# Patient Record
Sex: MALE | Race: WHITE | Employment: FULL TIME | URBAN - METROPOLITAN AREA
[De-identification: names, ages, dates, MRNs, and addresses within clinical notes are randomized per-mention and may not be internally consistent; named-entity substitution may affect disease eponyms.]

---

## 2017-12-17 ENCOUNTER — HOSPITAL ENCOUNTER (EMERGENCY)
Age: 37
Discharge: HOME | End: 2017-12-17
Attending: EMERGENCY MEDICINE | Admitting: EMERGENCY MEDICINE
Payer: COMMERCIAL

## 2017-12-17 ENCOUNTER — APPOINTMENT (OUTPATIENT)
Dept: GENERAL RADIOLOGY | Age: 37
End: 2017-12-17
Attending: EMERGENCY MEDICINE
Payer: COMMERCIAL

## 2017-12-17 VITALS
HEART RATE: 94 BPM | HEIGHT: 69 IN | DIASTOLIC BLOOD PRESSURE: 95 MMHG | BODY MASS INDEX: 29.62 KG/M2 | SYSTOLIC BLOOD PRESSURE: 148 MMHG | OXYGEN SATURATION: 100 % | RESPIRATION RATE: 16 BRPM | TEMPERATURE: 98.6 F | WEIGHT: 200 LBS
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
## 2017-12-17 DIAGNOSIS — S39.012A STRAIN OF LUMBAR REGION, INITIAL ENCOUNTER: ICD-10-CM

## 2017-12-17 DIAGNOSIS — S46.912A STRAIN OF LEFT SHOULDER, INITIAL ENCOUNTER: Primary | ICD-10-CM

## 2017-12-17 PROCEDURE — 99283 EMERGENCY DEPT VISIT LOW MDM: CPT

## 2017-12-17 PROCEDURE — 72110 X-RAY EXAM L-2 SPINE 4/>VWS: CPT

## 2017-12-17 PROCEDURE — 73030 X-RAY EXAM OF SHOULDER: CPT

## 2017-12-17 PROCEDURE — 74011250637 HC RX REV CODE- 250/637: Performed by: EMERGENCY MEDICINE

## 2017-12-17 RX ORDER — IBUPROFEN 800 MG/1
800 TABLET ORAL
Qty: 20 TAB | Refills: 0 | Status: SHIPPED | OUTPATIENT
Start: 2017-12-17 | End: 2017-12-24

## 2017-12-17 RX ORDER — CYCLOBENZAPRINE HCL 5 MG
10 TABLET ORAL 3 TIMES DAILY
Qty: 9 TAB | Refills: 0 | Status: SHIPPED | OUTPATIENT
Start: 2017-12-17

## 2017-12-17 RX ORDER — IBUPROFEN 400 MG/1
800 TABLET ORAL
Status: COMPLETED | OUTPATIENT
Start: 2017-12-17 | End: 2017-12-17

## 2017-12-17 RX ADMIN — IBUPROFEN 800 MG: 400 TABLET, FILM COATED ORAL at 15:18
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
## 2017-12-17 NOTE — LETTER
[facility] EMERGENCY DEPT 
[address] [place] [id number] [phone] Work/School Note Date: 12/17/2017 To Whom It May concern: 
 
Tasha Oreilly was seen and treated today in the emergency room by the following provider(s): 
Attending Provider: Haja Escobar MD. Tasha Oreilly may return to work on 12/19/17 with LIGHT DUTY for 1 week or until cleared by physician. LIGHT DUTY: no lifting, bending, twisting, no prolonged work standing. Sincerely, Haja Escobar MD

## 2017-12-17 NOTE — DISCHARGE INSTRUCTIONS
Back Strain: Care Instructions  Your Care Instructions    Back strain happens when you overstretch, or pull, a muscle in your back. You may hurt your back in an accident or when you exercise or lift something. Most back pain will get better with rest and time. You can take care of yourself at home to help your back heal.  Follow-up care is a key part of your treatment and safety. Be sure to make and go to all appointments, and call your doctor if you are having problems. It's also a good idea to know your test results and keep a list of the medicines you take. How can you care for yourself at home? · Try to stay as active as you can, but stop or reduce any activity that causes pain. · Put ice or a cold pack on the sore muscle for 10 to 20 minutes at a time to stop swelling. Try this every 1 to 2 hours for 3 days (when you are awake) or until the swelling goes down. Put a thin cloth between the ice pack and your skin. · After 2 or 3 days, apply a heating pad on low or a warm cloth to your back. Some doctors suggest that you go back and forth between hot and cold treatments. · Take pain medicines exactly as directed. ¨ If the doctor gave you a prescription medicine for pain, take it as prescribed. ¨ If you are not taking a prescription pain medicine, ask your doctor if you can take an over-the-counter medicine. · Try sleeping on your side with a pillow between your legs. Or put a pillow under your knees when you lie on your back. These measures can ease pain in your lower back. · Return to your usual level of activity slowly. When should you call for help? Call 911 anytime you think you may need emergency care. For example, call if:  ? · You are unable to move a leg at all. ?Call your doctor now or seek immediate medical care if:  ? · You have new or worse symptoms in your legs, belly, or buttocks. Symptoms may include:  ¨ Numbness or tingling. ¨ Weakness. ¨ Pain.    ? · You lose bladder or bowel control. ? Watch closely for changes in your health, and be sure to contact your doctor if you are not getting better as expected. Where can you learn more? Go to http://jakob-jennifer.info/. Enter T604 in the search box to learn more about \"Back Strain: Care Instructions. \"  Current as of: March 21, 2017  Content Version: 11.4  © 7976-8716 [employer]. Care instructions adapted under license by [employer] (which disclaims liability or warranty for this information). If you have questions about a medical condition or this instruction, always ask your healthcare professional. [place] [address] any warranty or liability for your use of this information.

## 2017-12-20 ENCOUNTER — OFFICE VISIT (OUTPATIENT)
Dept: ORTHOPEDIC SURGERY | Facility: CLINIC | Age: 37
End: 2017-12-20

## 2017-12-20 VITALS
TEMPERATURE: 96.8 F | HEIGHT: 69 IN | OXYGEN SATURATION: 99 % | SYSTOLIC BLOOD PRESSURE: 121 MMHG | RESPIRATION RATE: 18 BRPM | HEART RATE: 90 BPM | DIASTOLIC BLOOD PRESSURE: 78 MMHG | WEIGHT: 205.8 LBS | BODY MASS INDEX: 30.48 KG/M2

## 2017-12-20 DIAGNOSIS — S43.52XA SPRAIN OF LEFT ACROMIOCLAVICULAR JOINT, INITIAL ENCOUNTER: Primary | ICD-10-CM

## 2017-12-20 DIAGNOSIS — S14.3XXA BRACHIAL PLEXUS INJURY, LEFT, INITIAL ENCOUNTER: ICD-10-CM

## 2017-12-20 DIAGNOSIS — S40.012A: ICD-10-CM

## 2017-12-20 PROBLEM — F33.9 RECURRENT DEPRESSION (HCC): Status: ACTIVE | Noted: 2017-12-20

## 2017-12-21 NOTE — PATIENT INSTRUCTIONS
Shoulder Separation: Care Instructions  Your Care Instructions    A shoulder separation is a tearing of the ligaments that connect two bones of the shoulder-the collarbone (clavicle) and the end of the shoulder blade (acromion). The ligaments can be partially or completely torn. This is usually caused by a blow to the top of the shoulder or a fall onto an outstretched arm. Shoulder injuries can be slow to heal, but with time and effort, your shoulder should get better. Physical therapy can help you regain strength, motion, and flexibility in your shoulder. Follow-up care is a key part of your treatment and safety. Be sure to make and go to all appointments, and call your doctor if you are having problems. It's also a good idea to know your test results and keep a list of the medicines you take. How can you care for yourself at home? · If your doctor put your arm in a sling, wear the sling as directed. Do not take it off before your doctor tells you to. · Take pain medicines exactly as directed. ¨ If the doctor gave you a prescription medicine for pain, take it as prescribed. ¨ If you are not taking a prescription pain medicine, ask your doctor if you can take an over-the-counter medicine. · Rest your shoulder as much as you can. · Put ice or a cold pack on your shoulder for 10 to 20 minutes at a time. Try to do this every 1 to 2 hours for the next 3 days (when you are awake) or until the swelling goes down. Put a thin cloth between the ice and your skin. · You may use warm packs after the first 3 days for 15 to 20 minutes at a time to ease pain. · If your doctor gave you exercises to do at home, do them exactly as instructed. · Do not do anything that makes pain worse. · Go to all follow-up appointments. You and your doctor will decide if you need further treatment, including surgery. You and your doctor will also decide when to begin physical therapy, if it is needed.   When should you call for help?  Call your doctor now or seek immediate medical care if:  ? · Your pain gets a lot worse. ? · You cannot move your arm. ? · You have new weakness, numbness, or tingling in your hand or arm. ? · Your arm or hand is cool or pale or changes color. ? · Your sling feels too tight, and you cannot loosen it. ? Watch closely for changes in your health, and be sure to contact your doctor if:  ? · You have new or increased swelling in your arm. ? · You have new pain that develops in another area of your arm. For example, you have pain in your hand or elbow. ? · You do not get better as expected. Where can you learn more? Go to http://jakob-jennifer.info/. Enter S051 in the search box to learn more about \"Shoulder Separation: Care Instructions. \"  Current as of: March 21, 2017  Content Version: 11.4  © 2537-4099 [employer]. Care instructions adapted under license by [employer] (which disclaims liability or warranty for this information). If you have questions about a medical condition or this instruction, always ask your healthcare professional. [place] [address] any warranty or liability for your use of this information.
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
## 2018-01-03 ENCOUNTER — OFFICE VISIT (OUTPATIENT)
Dept: ORTHOPEDIC SURGERY | Facility: CLINIC | Age: 38
End: 2018-01-03

## 2018-01-03 VITALS
HEART RATE: 80 BPM | DIASTOLIC BLOOD PRESSURE: 83 MMHG | RESPIRATION RATE: 16 BRPM | SYSTOLIC BLOOD PRESSURE: 131 MMHG | OXYGEN SATURATION: 100 % | HEIGHT: 69 IN

## 2018-01-03 DIAGNOSIS — S40.012D: ICD-10-CM

## 2018-01-03 DIAGNOSIS — S43.52XD ACROMIOCLAVICULAR SPRAIN, LEFT, SUBSEQUENT ENCOUNTER: Primary | ICD-10-CM

## 2018-01-03 NOTE — PROGRESS NOTES
Patient: Savannah Gomez                MRN: [id number]       SSN: [id number]  YOB: 1980        AGE: 40 y.o. SEX: male  There is no height or weight on file to calculate BMI. PCP: Kaylin Ro MD  01/03/18    Chief Complaint: No changes from previous visit complaint. HPI: Savannah Gomez returns today for a follow up appointment for his left shoulder injury. He has been gradually getting back to his activities with his left arm, doing only light things. Last week, while pushing up from a chair, he felt a pop in his left shoulder/clavicle area with a short pain which has since resolved. He has not felt this again as of today. No other complaints at this time. No complaints of numbness/tingling/weakness. Past Medical History:   Diagnosis Date    Anxiety     Depression        History reviewed. No pertinent family history. Current Outpatient Prescriptions   Medication Sig Dispense Refill    cyclobenzaprine (FLEXERIL) 5 mg tablet Take 2 Tabs by mouth three (3) times daily. 9 Tab 0    ALPRAZolam (XANAX) 2 mg tablet Begin with one half of a tab 1 hour prior to the procedure. May repeat dose 30 minutes later if initial dose had little to no effect. If you take the medication, you must have somebody drive you home from the procedure. 1 Tab 0    varenicline (CHANTIX) 1 mg tablet Take 1 mg by mouth two (2) times daily (after meals).  SERTRALINE HCL (ZOLOFT PO) Take  by mouth.  NAPROXEN (NAPROSYN PO) Take  by mouth. No Known Allergies    History reviewed. No pertinent surgical history. Social History     Social History    Marital status: SINGLE     Spouse name: N/A    Number of children: N/A    Years of education: N/A     Occupational History    Not on file.      Social History Main Topics    Smoking status: Former Smoker     Packs/day: 1.00    Smokeless tobacco: Never Used    Alcohol use 0.0 oz/week     0 Standard drinks or equivalent per week    Drug use: No    Sexual activity: Not on file     Other Topics Concern    Not on file     Social History Narrative       REVIEW OF SYSTEMS:      No changes from previous review of systems unless noted. PHYSICAL EXAMINATION:  GENERAL: Alert and oriented x3, in no acute distress. HEENT: Normocephalic, atraumatic. RESP: Non labored breathing. SKIN: No rashes or lesions noted. MUSCULOSKELETAL: Left shoulder with full ROM /ER30/IR L2. Full strength on rotator cuff testing. Full strength without pain with biceps testing. Nontender over [facility] joint. Nontender over clavicle. Mild TTP over clavipectoral fascia area over anterior upper left chest.  No signs of trauma. SILT/NVI distally. No palpable defect or deformity over AC joint. Radiology: None taken todat    Impression/Plan:   Kayleigh Marquez is recovering from his left shoulder contusion/soft tissue injury. I do not see anything today that points specifically to the cause of his symptoms of temporary pain in his shoulder recently, perhaps scar tissue being broken up. Either way, I think he should continue to advance his activities as tolerated. He is to remain on light duty until 1/12/18 and then he will be able to advance back to full duty from my standpoint. Follow up as needed.        Electronically signed by: Marisela Munoz MD

## 2018-06-14 ENCOUNTER — TELEPHONE (OUTPATIENT)
Dept: ORTHOPEDIC SURGERY | Facility: CLINIC | Age: 38
End: 2018-06-14
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
